# Patient Record
Sex: MALE | Race: OTHER | HISPANIC OR LATINO | Employment: FULL TIME | URBAN - METROPOLITAN AREA
[De-identification: names, ages, dates, MRNs, and addresses within clinical notes are randomized per-mention and may not be internally consistent; named-entity substitution may affect disease eponyms.]

---

## 2020-12-30 ENCOUNTER — HOSPITAL ENCOUNTER (EMERGENCY)
Facility: HOSPITAL | Age: 20
Discharge: HOME | End: 2020-12-30
Attending: EMERGENCY MEDICINE
Payer: COMMERCIAL

## 2020-12-30 VITALS
WEIGHT: 171 LBS | RESPIRATION RATE: 16 BRPM | OXYGEN SATURATION: 99 % | HEART RATE: 71 BPM | SYSTOLIC BLOOD PRESSURE: 125 MMHG | TEMPERATURE: 98.7 F | HEIGHT: 66 IN | BODY MASS INDEX: 27.48 KG/M2 | DIASTOLIC BLOOD PRESSURE: 82 MMHG

## 2020-12-30 DIAGNOSIS — R30.0 DYSURIA: Primary | ICD-10-CM

## 2020-12-30 LAB
BILIRUB UR QL STRIP: NEGATIVE
CLARITY UR: CLEAR
COLOR UR: YELLOW
COLOR, POC: NORMAL
GLUCOSE UR STRIP-MCNC: NEGATIVE MG/DL
HGB UR QL STRIP.AUTO: NEGATIVE
KETONES UR STRIP-MCNC: NEGATIVE MG/DL
LEUKOCYTE ESTERASE UR QL STRIP: NEGATIVE
NITRITE UR QL STRIP: NEGATIVE
PH UR STRIP.AUTO: 8.5 [PH] (ref 4.5–8)
PROT UR STRIP-MCNC: NEGATIVE MG/DL
SP GR UR STRIP.AUTO: 1.02 (ref 1–1.03)
UROBILINOGEN UR QL STRIP.AUTO: 1 E.U./DL

## 2020-12-30 PROCEDURE — 99283 EMERGENCY DEPT VISIT LOW MDM: CPT

## 2020-12-30 PROCEDURE — 87591 N.GONORRHOEAE DNA AMP PROB: CPT | Performed by: EMERGENCY MEDICINE

## 2020-12-30 PROCEDURE — 81003 URINALYSIS AUTO W/O SCOPE: CPT

## 2020-12-30 PROCEDURE — 87491 CHLMYD TRACH DNA AMP PROBE: CPT | Performed by: EMERGENCY MEDICINE

## 2020-12-30 PROCEDURE — 99284 EMERGENCY DEPT VISIT MOD MDM: CPT | Performed by: EMERGENCY MEDICINE

## 2020-12-30 NOTE — Clinical Note
Flo Ramirez was seen and treated in our emergency department on 12/30/2020  Diagnosis:     Anil Martin  may return to work on return date  He may return on this date: 01/01/2021         If you have any questions or concerns, please don't hesitate to call        River Jean MD    ______________________________           _______________          _______________  Hospital Representative                              Date                                Time

## 2020-12-30 NOTE — DISCHARGE INSTRUCTIONS
We will call with results of your STD testing in the next 1-2 days  Return to the emergency department if symptoms worsen

## 2020-12-31 LAB
C TRACH DNA SPEC QL NAA+PROBE: NEGATIVE
N GONORRHOEA DNA SPEC QL NAA+PROBE: NEGATIVE

## 2020-12-31 NOTE — ED PROVIDER NOTES
History  Chief Complaint   Patient presents with    Medical Problem     Pt states he thinks he has an infection  HPI  Patient is a 30-year-old male no significant past medical history presenting for 2 days of dysuria  Patient denies hematuria, urethral discharge, inguinal lymphadenopathy, genital sores, testicular pain, fevers, chills  Patient states that his sexual partner has been having similar symptoms for past 2 days as well  Patient states that he is monogamous  Patient denies hematuria, flank pain, history of nephrolithiasis, has never had similar symptoms in the past   None       History reviewed  No pertinent past medical history  History reviewed  No pertinent surgical history  History reviewed  No pertinent family history  I have reviewed and agree with the history as documented  E-Cigarette/Vaping    E-Cigarette Use Never User      E-Cigarette/Vaping Substances    Nicotine No     THC No     CBD No     Flavoring No     Other No     Unknown No      Social History     Tobacco Use    Smoking status: Never Smoker    Smokeless tobacco: Never Used   Substance Use Topics    Alcohol use: Never     Frequency: Never    Drug use: Never        Review of Systems   Constitutional: Negative for chills and fever  Respiratory: Negative for cough and shortness of breath  Gastrointestinal: Negative for nausea and vomiting  Genitourinary: Positive for dysuria  Negative for discharge, flank pain, frequency, genital sores, penile pain, testicular pain and urgency  Musculoskeletal: Negative for arthralgias and myalgias  Skin: Negative for color change, pallor, rash and wound  Neurological: Negative for headaches  Psychiatric/Behavioral: Negative for confusion         Physical Exam  ED Triage Vitals [12/30/20 1429]   Temperature Pulse Respirations Blood Pressure SpO2   98 7 °F (37 1 °C) 71 16 125/82 99 %      Temp Source Heart Rate Source Patient Position - Orthostatic VS BP Location FiO2 (%)   Oral Monitor Sitting Right arm --      Pain Score       --             Orthostatic Vital Signs  Vitals:    12/30/20 1429   BP: 125/82   Pulse: 71   Patient Position - Orthostatic VS: Sitting       Physical Exam  Vitals signs and nursing note reviewed  Constitutional:       General: He is not in acute distress  Appearance: He is well-developed  He is not diaphoretic  HENT:      Head: Normocephalic and atraumatic  Right Ear: External ear normal       Left Ear: External ear normal       Nose: Nose normal       Mouth/Throat:      Pharynx: No oropharyngeal exudate  Eyes:      Conjunctiva/sclera: Conjunctivae normal    Cardiovascular:      Rate and Rhythm: Normal rate and regular rhythm  Heart sounds: Normal heart sounds  No murmur  No friction rub  No gallop  Pulmonary:      Effort: Pulmonary effort is normal  No respiratory distress  Breath sounds: Normal breath sounds  No wheezing  Chest:      Chest wall: No tenderness  Abdominal:      General: Bowel sounds are normal  There is no distension  Palpations: Abdomen is soft  There is no mass  Tenderness: There is no abdominal tenderness  There is no guarding or rebound  Genitourinary:     Comments: No suprapubic tenderness  No CVA tenderness  Musculoskeletal:         General: No deformity  Skin:     General: Skin is warm and dry  Capillary Refill: Capillary refill takes less than 2 seconds  Neurological:      Mental Status: He is alert and oriented to person, place, and time     Psychiatric:         Behavior: Behavior normal          ED Medications  Medications - No data to display    Diagnostic Studies  Results Reviewed     Procedure Component Value Units Date/Time    Chlamydia/GC amplified DNA by PCR [[id number]]  (Normal) Collected: 12/30/20 1614    Lab Status: Final result Specimen: Urine, Other Updated: 12/31/20 0656     N gonorrhoeae, DNA Probe Negative     Chlamydia trachomatis, DNA Probe Negative Narrative:      Test performed using PCR amplification of target DNA  This test is intended as an aid in the diagnosis of Chlamydial and gonococcal disease  This test has not been evaluated in patients younger than 15years of age and is not recommended for evaluation of suspected sexual abuse  Additional testing is recommended when the results do not correlate with clinical signs and symptoms  POCT urinalysis dipstick [[id number]]  (Normal) Resulted: 12/30/20 1605    Lab Status: Final result Updated: 12/30/20 1605     Color, UA       Urine Macroscopic, POC [[id number]]  (Abnormal) Collected: 12/30/20 1602    Lab Status: Final result Specimen: Urine Updated: 12/30/20 1603     Color, UA Yellow     Clarity, UA Clear     pH, UA 8 5     Leukocytes, UA Negative     Nitrite, UA Negative     Protein, UA Negative mg/dl      Glucose, UA Negative mg/dl      Ketones, UA Negative mg/dl      Urobilinogen, UA 1 0 E U /dl      Bilirubin, UA Negative     Blood, UA Negative     Specific Gravity, UA 1 020    Narrative:      CLINITEK RESULT                 No orders to display         Procedures  Procedures      ED Course                                       MDM  Number of Diagnoses or Management Options  Dysuria:   Diagnosis management comments: 24-year-old male with 2 days of dysuria, partner with similar symptoms, no additional symptoms, no high-risk sexual history, benign examination, urine GC chlamydia and urinalysis checked, urinalysis normal, discussed with patient and decision to wait to treat empirically, will follow-up results of GC chlamydia which were ultimately negative       Amount and/or Complexity of Data Reviewed  Clinical lab tests: ordered and reviewed  Decide to obtain previous medical records or to obtain history from someone other than the patient: yes  Review and summarize past medical records: yes  Independent visualization of images, tracings, or specimens: yes    Risk of Complications, Morbidity, and/or Mortality  Presenting problems: low  Diagnostic procedures: low  Management options: low    Patient Progress  Patient progress: improved      Disposition  Final diagnoses:   Dysuria     Time reflects when diagnosis was documented in both MDM as applicable and the Disposition within this note     Time User Action Codes Description Comment    12/30/2020  4:17 PM Mercvalencia Rivera Add [Y93 G3] Use of stove, oven and microwave oven     12/30/2020  4:17 PM Mercy Nicole Remove [Y93 G3] Use of stove, oven and microwave oven     12/30/2020  5:19 PM Mercvalencia Nicole Add [R30 0] Dysuria       ED Disposition     ED Disposition Condition Date/Time Comment    Discharge Stable Wed Dec 30, 2020  5:19 PM Mengcaitlingdamián 75 discharge to home/self care  Follow-up Information    None         There are no discharge medications for this patient  No discharge procedures on file  PDMP Review     None           ED Provider  Attending physically available and evaluated Davideenherbgtiffaniej 75  I managed the patient along with the ED Attending      Electronically Signed by         River Jean MD  12/31/20 4210

## 2021-01-01 NOTE — ED ATTENDING ATTESTATION
12/30/2020  IDoris MD, saw and evaluated the patient  I have discussed the patient with the resident/non-physician practitioner and agree with the resident's/non-physician practitioner's findings, Plan of Care, and MDM as documented in the resident's/non-physician practitioner's note, except where noted  All available labs and Radiology studies were reviewed  I was present for key portions of any procedure(s) performed by the resident/non-physician practitioner and I was immediately available to provide assistance  At this point I agree with the current assessment done in the Emergency Department  I have conducted an independent evaluation of this patient a history and physical is as follows: This is a 23 y o  old male who presents to the ED for evaluation of dysuria  2d  No discharge  Sexually active with female, she is with him, same symtpoms, pregnant  No fever, no abd pain  No other complaitns  VS and nursing notes reviewed  General: Appears in NAD, awake, alert, speaking normally in full sentences  Well-nourished, well-developed  Appears stated age  Head: Normocephalic, atraumatic  Eyes: EOMI  Vision grossly normal  No subconjunctival hemorrhages or occular discharge noted  Symmetrical lids  ENT: Atraumatic external nose and ears  No stridor  Normal phonation  No drooling  Normal swallowing  Neck: No JVD  FROM  No goiter  CV: No pallor  Normal rate  Lungs: No tachypnea  No respiratory distress  MSK: Moving all extremities equally, no peripheral edema  Skin: Dry, intact  No cyanosis  Neuro: Awake, alert, GCS15  CN II-XII grossly intact  Grossly normal gait  Psychiatric/Behavioral: Appropriate mood and affect  A/P: This is a 23 y o  male who presents to the ED for evaluation of dysuria  UA, GC/Chlamydia  Consider trich treatment based on GF sypmptoms          ED Course         Critical Care Time  Procedures

## 2021-02-04 ENCOUNTER — HOSPITAL ENCOUNTER (EMERGENCY)
Facility: HOSPITAL | Age: 21
Discharge: HOME | End: 2021-02-04
Attending: EMERGENCY MEDICINE | Admitting: EMERGENCY MEDICINE
Payer: COMMERCIAL

## 2021-02-04 ENCOUNTER — TRANSCRIBE ORDERS (OUTPATIENT)
Dept: ADMINISTRATIVE | Facility: HOSPITAL | Age: 21
End: 2021-02-04

## 2021-02-04 VITALS
WEIGHT: 173 LBS | TEMPERATURE: 97.6 F | SYSTOLIC BLOOD PRESSURE: 135 MMHG | BODY MASS INDEX: 27.92 KG/M2 | DIASTOLIC BLOOD PRESSURE: 80 MMHG | OXYGEN SATURATION: 98 % | HEART RATE: 79 BPM | RESPIRATION RATE: 18 BRPM

## 2021-02-04 DIAGNOSIS — R10.13 EPIGASTRIC PAIN: Primary | ICD-10-CM

## 2021-02-04 DIAGNOSIS — R10.9 ABDOMINAL PAIN, UNSPECIFIED ABDOMINAL LOCATION: Primary | ICD-10-CM

## 2021-02-04 LAB
ALBUMIN SERPL BCP-MCNC: 4.4 G/DL (ref 3.5–5)
ALP SERPL-CCNC: 91 U/L (ref 46–116)
ALT SERPL W P-5'-P-CCNC: 32 U/L (ref 12–78)
AMYLASE SERPL-CCNC: 79 IU/L (ref 25–115)
ANION GAP SERPL CALCULATED.3IONS-SCNC: 5 MMOL/L (ref 4–13)
AST SERPL W P-5'-P-CCNC: 33 U/L (ref 5–45)
BASOPHILS # BLD AUTO: 0.03 THOUSANDS/ΜL (ref 0–0.1)
BASOPHILS NFR BLD AUTO: 0 % (ref 0–1)
BILIRUB SERPL-MCNC: 0.32 MG/DL (ref 0.2–1)
BUN SERPL-MCNC: 16 MG/DL (ref 5–25)
CALCIUM SERPL-MCNC: 10.2 MG/DL (ref 8.3–10.1)
CHLORIDE SERPL-SCNC: 106 MMOL/L (ref 100–108)
CO2 SERPL-SCNC: 29 MMOL/L (ref 21–32)
CREAT SERPL-MCNC: 1.1 MG/DL (ref 0.6–1.3)
EOSINOPHIL # BLD AUTO: 0.5 THOUSAND/ΜL (ref 0–0.61)
EOSINOPHIL NFR BLD AUTO: 7 % (ref 0–6)
ERYTHROCYTE [DISTWIDTH] IN BLOOD BY AUTOMATED COUNT: 12.2 % (ref 11.6–15.1)
GFR SERPL CREATININE-BSD FRML MDRD: 95 ML/MIN/1.73SQ M
GLUCOSE SERPL-MCNC: 86 MG/DL (ref 65–140)
HCT VFR BLD AUTO: 47.4 % (ref 36.5–49.3)
HGB BLD-MCNC: 16.1 G/DL (ref 12–17)
IMM GRANULOCYTES # BLD AUTO: 0.01 THOUSAND/UL (ref 0–0.2)
IMM GRANULOCYTES NFR BLD AUTO: 0 % (ref 0–2)
LIPASE SERPL-CCNC: 155 U/L (ref 73–393)
LYMPHOCYTES # BLD AUTO: 1.21 THOUSANDS/ΜL (ref 0.6–4.47)
LYMPHOCYTES NFR BLD AUTO: 18 % (ref 14–44)
MCH RBC QN AUTO: 29.7 PG (ref 26.8–34.3)
MCHC RBC AUTO-ENTMCNC: 34 G/DL (ref 31.4–37.4)
MCV RBC AUTO: 87 FL (ref 82–98)
MONOCYTES # BLD AUTO: 0.6 THOUSAND/ΜL (ref 0.17–1.22)
MONOCYTES NFR BLD AUTO: 9 % (ref 4–12)
NEUTROPHILS # BLD AUTO: 4.52 THOUSANDS/ΜL (ref 1.85–7.62)
NEUTS SEG NFR BLD AUTO: 66 % (ref 43–75)
NRBC BLD AUTO-RTO: 0 /100 WBCS
PLATELET # BLD AUTO: 315 THOUSANDS/UL (ref 149–390)
PMV BLD AUTO: 8.9 FL (ref 8.9–12.7)
POTASSIUM SERPL-SCNC: 4 MMOL/L (ref 3.5–5.3)
PROT SERPL-MCNC: 8.3 G/DL (ref 6.4–8.2)
RBC # BLD AUTO: 5.43 MILLION/UL (ref 3.88–5.62)
SODIUM SERPL-SCNC: 140 MMOL/L (ref 136–145)
WBC # BLD AUTO: 6.87 THOUSAND/UL (ref 4.31–10.16)

## 2021-02-04 PROCEDURE — 36415 COLL VENOUS BLD VENIPUNCTURE: CPT | Performed by: EMERGENCY MEDICINE

## 2021-02-04 PROCEDURE — 82150 ASSAY OF AMYLASE: CPT | Performed by: EMERGENCY MEDICINE

## 2021-02-04 PROCEDURE — 99285 EMERGENCY DEPT VISIT HI MDM: CPT | Performed by: EMERGENCY MEDICINE

## 2021-02-04 PROCEDURE — 83690 ASSAY OF LIPASE: CPT | Performed by: EMERGENCY MEDICINE

## 2021-02-04 PROCEDURE — 80053 COMPREHEN METABOLIC PANEL: CPT | Performed by: EMERGENCY MEDICINE

## 2021-02-04 PROCEDURE — 99283 EMERGENCY DEPT VISIT LOW MDM: CPT

## 2021-02-04 PROCEDURE — 85025 COMPLETE CBC W/AUTO DIFF WBC: CPT | Performed by: EMERGENCY MEDICINE

## 2021-02-04 NOTE — ED ATTENDING ATTESTATION
2/4/2021  IAriela MD, saw and evaluated the patient  I have discussed the patient with the resident/non-physician practitioner and agree with the resident's/non-physician practitioner's findings, Plan of Care, and MDM as documented in the resident's/non-physician practitioner's note, except where noted  All available labs and Radiology studies were reviewed  I was present for key portions of any procedure(s) performed by the resident/non-physician practitioner and I was immediately available to provide assistance  At this point I agree with the current assessment done in the Emergency Department  I have conducted an independent evaluation of this patient a history and physical is as follows:    ED Course         Critical Care Time  Procedures    22 yo male sent in from np for acid reflux and told to get labs done here  Pt with no acute complaints currently  No cp, no sob, no n/v/d  Vss, afebrile, lungs cta, rrr, abdomen soft nontender  Labs

## 2021-02-04 NOTE — ED PROVIDER NOTES
History  Chief Complaint   Patient presents with    Medical Problem     Pt states he was at a doctors appointment and was told to come to ED for acid reflux eval  Pt c/o of nausea and diarrhea  43-year-old male presents with epi gastric pain, nausea, and heartburn began last Wednesday  Patient was seen by nurse practitioner earlier today  She recommended outpatient labs, but it seems as if patient came to the ED  Patient says he has the heartburn in the morning  He feels nauseous after he eats  Patient has only tried Pepto-Bismol for his symptoms, and it has only helped with some intermittent diarrhea that he has had  Patient also notes at times he does have constipation  No blood in his stool  Patient denies chest pain  He has had 2 episodes of vomiting  He has never had the symptoms before in past           None       History reviewed  No pertinent past medical history  History reviewed  No pertinent surgical history  History reviewed  No pertinent family history  I have reviewed and agree with the history as documented  E-Cigarette/Vaping    E-Cigarette Use Never User      E-Cigarette/Vaping Substances    Nicotine No     THC No     CBD No     Flavoring No     Other No     Unknown No      Social History     Tobacco Use    Smoking status: Never Smoker    Smokeless tobacco: Never Used   Substance Use Topics    Alcohol use: Never     Frequency: Never    Drug use: Never        Review of Systems   Constitutional: Negative for appetite change, chills, fatigue and fever  HENT: Negative  Eyes: Negative  Respiratory: Negative for cough, chest tightness and shortness of breath  Cardiovascular: Negative for chest pain and palpitations  Gastrointestinal: Positive for abdominal pain and nausea  Negative for diarrhea and vomiting  Endocrine: Negative  Genitourinary: Negative for difficulty urinating and hematuria  Musculoskeletal: Negative for arthralgias and myalgias  Skin: Negative for pallor and rash  Allergic/Immunologic: Negative  Neurological: Negative for dizziness, weakness, light-headedness and headaches  Hematological: Negative  Physical Exam  ED Triage Vitals [02/04/21 1724]   Temperature Pulse Respirations Blood Pressure SpO2   97 6 °F (36 4 °C) 79 18 135/80 98 %      Temp Source Heart Rate Source Patient Position - Orthostatic VS BP Location FiO2 (%)   Oral Monitor Sitting Right arm --      Pain Score       --             Orthostatic Vital Signs  Vitals:    02/04/21 1724   BP: 135/80   Pulse: 79   Patient Position - Orthostatic VS: Sitting       Physical Exam  Vitals signs and nursing note reviewed  Constitutional:       Appearance: Normal appearance  HENT:      Head: Normocephalic and atraumatic  Eyes:      Conjunctiva/sclera: Conjunctivae normal    Neck:      Musculoskeletal: Normal range of motion and neck supple  Cardiovascular:      Rate and Rhythm: Normal rate and regular rhythm  Pulmonary:      Effort: Pulmonary effort is normal    Abdominal:      General: Abdomen is flat  There is no distension  Palpations: Abdomen is soft  Tenderness: There is no abdominal tenderness  Musculoskeletal: Normal range of motion  Skin:     General: Skin is warm and dry  Neurological:      General: No focal deficit present  Mental Status: He is alert and oriented to person, place, and time           ED Medications  Medications - No data to display    Diagnostic Studies  Results Reviewed     Procedure Component Value Units Date/Time    Lipase [[id number]]  (Normal) Collected: 02/04/21 1833    Lab Status: Final result Specimen: Blood from Arm, Right Updated: 02/04/21 1903     Lipase 155 u/L     Amylase [[id number]]  (Normal) Collected: 02/04/21 1833    Lab Status: Final result Specimen: Blood from Arm, Right Updated: 02/04/21 1903     Amylase 79 IU/L     Comprehensive metabolic panel [[id number]]  (Abnormal) Collected: 02/04/21 1833    Lab Status: Final result Specimen: Blood from Arm, Right Updated: 02/04/21 1903     Sodium 140 mmol/L      Potassium 4 0 mmol/L      Chloride 106 mmol/L      CO2 29 mmol/L      ANION GAP 5 mmol/L      BUN 16 mg/dL      Creatinine 1 10 mg/dL      Glucose 86 mg/dL      Calcium 10 2 mg/dL      AST 33 U/L      ALT 32 U/L      Alkaline Phosphatase 91 U/L      Total Protein 8 3 g/dL      Albumin 4 4 g/dL      Total Bilirubin 0 32 mg/dL      eGFR 95 ml/min/1 73sq m     Narrative:      National Kid[employer] Disease Foundation guidelines for Chronic Kidney Disease (CKD):     Stage 1 with normal or high GFR (GFR > 90 mL/min/1 73 square meters)    Stage 2 Mild CKD (GFR = 60-89 mL/min/1 73 square meters)    Stage 3A Moderate CKD (GFR = 45-59 mL/min/1 73 square meters)    Stage 3B Moderate CKD (GFR = 30-44 mL/min/1 73 square meters)    Stage 4 Severe CKD (GFR = 15-29 mL/min/1 73 square meters)    Stage 5 End Stage CKD (GFR <15 mL/min/1 73 square meters)  Note: GFR calculation is accurate only with a steady state creatinine    CBC and differential [[id number]]  (Abnormal) Collected: 02/04/21 1833    Lab Status: Final result Specimen: Blood from Arm, Right Updated: 02/04/21 1842     WBC 6 87 Thousand/uL      RBC 5 43 Million/uL      Hemoglobin 16 1 g/dL      Hematocrit 47 4 %      MCV 87 fL      MCH 29 7 pg      MCHC 34 0 g/dL      RDW 12 2 %      MPV 8 9 fL      Platelets 029 Thousands/uL      nRBC 0 /100 WBCs      Neutrophils Relative 66 %      Immat GRANS % 0 %      Lymphocytes Relative 18 %      Monocytes Relative 9 %      Eosinophils Relative 7 %      Basophils Relative 0 %      Neutrophils Absolute 4 52 Thousands/µL      Immature Grans Absolute 0 01 Thousand/uL      Lymphocytes Absolute 1 21 Thousands/µL      Monocytes Absolute 0 60 Thousand/µL      Eosinophils Absolute 0 50 Thousand/µL      Basophils Absolute 0 03 Thousands/µL                  No orders to display         Procedures  Procedures      ED Course SBIRT 22yo+      Most Recent Value   SBIRT (22 yo +)   In order to provide better care to our patients, we are screening all of our patients for alcohol and drug use  Would it be okay to ask you these screening questions? Unable to answer at this time Filed at: 02/04/2021 1728                [facility]  Number of Diagnoses or Management Options  Epigastric pain: established and improving  Diagnosis management comments: Tried to see if patient could be un-registered and the labs could be done as an outpatient, but it cannot be done  Will run the labs and have patient follow-up with the nurse practitioner who ordered them  Patient is not currently having symptoms and does not want any medication  Amount and/or Complexity of Data Reviewed  Clinical lab tests: ordered and reviewed  Review and summarize past medical records: yes  Discuss the patient with other providers: yes    Risk of Complications, Morbidity, and/or Mortality  Presenting problems: minimal  Diagnostic procedures: minimal  Management options: minimal    Patient Progress  Patient progress: improved    Patient's lab work here is not significant for any acute pathology  Explained to patient that he has to follow-up with the nurse practitioner that ordered this lab work  Return precautions given  Disposition  Final diagnoses:   Epigastric pain     Time reflects when diagnosis was documented in both MDM as applicable and the Disposition within this note     Time User Action Codes Description Comment    2/4/2021  7:06 PM Maximino Freire Add [R10 13] Epigastric pain       ED Disposition     ED Disposition Condition Date/Time Comment    Discharge Good Thu Feb 4, 2021  7:06 PM Jef 75 discharge to home/self care  Follow-up Information     Follow up With Specialties Details Why Contact Info    Follow up with NP Homa              There are no discharge medications for this patient      No discharge procedures on file  PDMP Review     None           ED Provider  Attending physically available and evaluated Jaret Chery  CANDACE managed the patient along with the ED Attending      Electronically Signed by         Matthew Pederson DO  02/04/21 1916

## 2021-02-05 NOTE — DISCHARGE INSTRUCTIONS
You have been seen for epigastric pain  You should return to the ED if you develop persistent vomiting, blood in your stool, inability to eat, or other worsening symptoms  Follow up with Olga Lee who saw you earlier today

## 2021-02-10 ENCOUNTER — HOSPITAL ENCOUNTER (EMERGENCY)
Facility: HOSPITAL | Age: 21
Discharge: HOME | End: 2021-02-10
Attending: EMERGENCY MEDICINE | Admitting: EMERGENCY MEDICINE
Payer: COMMERCIAL

## 2021-02-10 VITALS
SYSTOLIC BLOOD PRESSURE: 139 MMHG | DIASTOLIC BLOOD PRESSURE: 83 MMHG | HEIGHT: 66 IN | HEART RATE: 72 BPM | TEMPERATURE: 98.1 F | RESPIRATION RATE: 18 BRPM | OXYGEN SATURATION: 99 % | BODY MASS INDEX: 27.78 KG/M2 | WEIGHT: 172.84 LBS

## 2021-02-10 DIAGNOSIS — H72.92 PERFORATION OF LEFT TYMPANIC MEMBRANE: Primary | ICD-10-CM

## 2021-02-10 PROCEDURE — 99284 EMERGENCY DEPT VISIT MOD MDM: CPT | Performed by: EMERGENCY MEDICINE

## 2021-02-10 PROCEDURE — 99282 EMERGENCY DEPT VISIT SF MDM: CPT

## 2021-02-10 RX ORDER — CIPROFLOXACIN AND DEXAMETHASONE 3; 1 MG/ML; MG/ML
4 SUSPENSION/ DROPS AURICULAR (OTIC) 2 TIMES DAILY
Qty: 7.5 ML | Refills: 0 | Status: SHIPPED | OUTPATIENT
Start: 2021-02-10

## 2021-02-10 RX ORDER — OFLOXACIN 3 MG/ML
5 SOLUTION AURICULAR (OTIC) 2 TIMES DAILY
Qty: 10 ML | Refills: 0 | Status: CANCELLED | OUTPATIENT
Start: 2021-02-10 | End: 2021-02-17

## 2021-02-10 NOTE — Clinical Note
Nasim Baldwin was seen and treated in our emergency department on 2/10/2021  Diagnosis:     Britton Echeverria  may return to work on return date  He may return on this date: 02/11/2021         If you have any questions or concerns, please don't hesitate to call        Freddy Mauricio DO    ______________________________           _______________          _______________  Hospital Representative                              Date                                Time

## 2021-02-10 NOTE — ED ATTENDING ATTESTATION
2/10/2021  IDavid DO, saw and evaluated the patient  I have discussed the patient with the resident/non-physician practitioner and agree with the resident's/non-physician practitioner's findings, Plan of Care, and MDM as documented in the resident's/non-physician practitioner's note, except where noted  All available labs and Radiology studies were reviewed  I was present for key portions of any procedure(s) performed by the resident/non-physician practitioner and I was immediately available to provide assistance  At this point I agree with the current assessment done in the Emergency Department  I have conducted an independent evaluation of this patient a history and physical is as follows:    16yo male presents with left ear pain  Pt had pain that started today and he put a q tip in ear  No fevers, no other complaints  On exam - nad, heart reg, no resp distress, right TM normal, left TM with small puncture    Plan - otic drops and f/u ENT    ED Course         Critical Care Time  Procedures

## 2021-02-10 NOTE — ED PROVIDER NOTES
History  Chief Complaint   Patient presents with    Ear Problem     Patient reports ear pain and possible infection; states all of this started today      79-year-old male presents emergency department for left ear pain  Patient says that pain started when he woke up this morning  He did use a Q-tip has had some mildly worsening pain since then  No drainage from the ear  No other ear trauma  Has not had a fever, chills, nausea, vomiting, any other symptoms or complaints  None       History reviewed  No pertinent past medical history  History reviewed  No pertinent surgical history  History reviewed  No pertinent family history  I have reviewed and agree with the history as documented  E-Cigarette/Vaping    E-Cigarette Use Never User      E-Cigarette/Vaping Substances    Nicotine No     THC No     CBD No     Flavoring No     Other No     Unknown No      Social History     Tobacco Use    Smoking status: Never Smoker    Smokeless tobacco: Never Used   Substance Use Topics    Alcohol use: Never     Frequency: Never    Drug use: Never        Review of Systems   Constitutional: Negative  Negative for fever  HENT: Positive for ear pain  Negative for ear discharge and sore throat  Eyes: Negative  Respiratory: Negative  Negative for cough  Gastrointestinal: Negative  Negative for nausea  Skin: Negative  Negative for rash  All other systems reviewed and are negative  Physical Exam  ED Triage Vitals [02/10/21 1639]   Temperature Pulse Respirations Blood Pressure SpO2   98 1 °F (36 7 °C) 72 18 139/83 99 %      Temp Source Heart Rate Source Patient Position - Orthostatic VS BP Location FiO2 (%)   Oral Monitor Sitting Right arm --      Pain Score       5             Orthostatic Vital Signs  Vitals:    02/10/21 1639   BP: 139/83   Pulse: 72   Patient Position - Orthostatic VS: Sitting       Physical Exam  Constitutional:       General: He is not in acute distress  Appearance: He is well-developed  HENT:      Head: Normocephalic and atraumatic  Right Ear: Hearing, ear canal and external ear normal  No middle ear effusion  There is no impacted cerumen  No foreign body  No mastoid tenderness  Tympanic membrane is not scarred, perforated or erythematous  Left Ear: Hearing and external ear normal   No middle ear effusion  There is no impacted cerumen  No foreign body  No mastoid tenderness  Tympanic membrane is perforated  Tympanic membrane is not erythematous  Neck:      Musculoskeletal: Normal range of motion and neck supple  Cardiovascular:      Rate and Rhythm: Normal rate  Pulmonary:      Effort: Pulmonary effort is normal    Musculoskeletal: Normal range of motion  Skin:     General: Skin is warm and dry  Capillary Refill: Capillary refill takes less than 2 seconds  Neurological:      Mental Status: He is alert and oriented to person, place, and time  ED Medications  Medications - No data to display    Diagnostic Studies  Results Reviewed     None                 No orders to display         Procedures  Procedures      ED Course                             SBIRT 22yo+      Most Recent Value   SBIRT (22 yo +)   In order to provide better care to our patients, we are screening all of our patients for alcohol and drug use  Would it be okay to ask you these screening questions? No Filed at: 02/10/2021 1641                [facility]  Number of Diagnoses or Management Options  Perforation of left tympanic membrane:   Diagnosis management comments: Exam consistent with perforation of the left TM  Sending home with otic antibiotics and ENT follow-up  ED return precautions provided should symptoms worsen and patient can otherwise follow up outpatient  Patient expresses an understanding and agreement with the plan and remains in good condition for discharge         Disposition  Final diagnoses:   Perforation of left tympanic membrane     Time reflects when diagnosis was documented in both MDM as applicable and the Disposition within this note     Time User Action Codes Description Comment    2/10/2021  5:33 PM Minor, Jeannette Add [H72 [id number]] Perforation of left tympanic membrane       ED Disposition     ED Disposition Condition Date/Time Comment    Discharge Good Wed Feb 10, 2021  5:33 PM Jef Breaux discharge to home/self care  Follow-up Information     Follow up With Specialties Details Why Contact Info Additional [facility] in 1 day  [address] [place] [address]  [address] [id number]  [facility], [address], [address] [place] [address]  400, [facility], [address] Emergency Department Emergency Medicine Go to  If symptoms worsen [address]  [address] [facility] 64 [facility] Emergency Department, [address], [place], [place], [address]   [phone]          Discharge Medication List as of 2/10/2021  5:34 PM      START taking these medications    Details   ciprofloxacin-dexamethasone (CIPRODEX) otic suspension Administer 4 drops into the left ear 2 (two) times a day, Starting Wed 2/10/2021, Print           No discharge procedures on file  PDMP Review     None           ED Provider  Attending physically available and evaluated Jef 76  I managed the patient along with the ED Attending      Electronically Signed by         Renetta San MD  02/10/21 2793

## 2022-02-17 ENCOUNTER — APPOINTMENT (OUTPATIENT)
Dept: LAB | Facility: HOSPITAL | Age: 22
End: 2022-02-17
Payer: MEDICARE

## 2022-02-17 DIAGNOSIS — R63.4 ABNORMAL WEIGHT LOSS: ICD-10-CM

## 2022-02-17 DIAGNOSIS — R10.30 ABDOMINAL PAIN, LOWER: ICD-10-CM

## 2022-02-17 LAB
ALBUMIN SERPL BCP-MCNC: 4.2 G/DL (ref 3.5–5)
ALP SERPL-CCNC: 70 U/L (ref 46–116)
ALT SERPL W P-5'-P-CCNC: 28 U/L (ref 12–78)
ANION GAP SERPL CALCULATED.3IONS-SCNC: 5 MMOL/L (ref 4–13)
AST SERPL W P-5'-P-CCNC: 23 U/L (ref 5–45)
BASOPHILS # BLD AUTO: 0.04 THOUSANDS/ΜL (ref 0–0.1)
BASOPHILS NFR BLD AUTO: 1 % (ref 0–1)
BILIRUB SERPL-MCNC: 0.42 MG/DL (ref 0.2–1)
BUN SERPL-MCNC: 17 MG/DL (ref 5–25)
CALCIUM SERPL-MCNC: 10.2 MG/DL (ref 8.3–10.1)
CHLORIDE SERPL-SCNC: 107 MMOL/L (ref 100–108)
CO2 SERPL-SCNC: 26 MMOL/L (ref 21–32)
CREAT SERPL-MCNC: 0.85 MG/DL (ref 0.6–1.3)
EOSINOPHIL # BLD AUTO: 0.47 THOUSAND/ΜL (ref 0–0.61)
EOSINOPHIL NFR BLD AUTO: 8 % (ref 0–6)
ERYTHROCYTE [DISTWIDTH] IN BLOOD BY AUTOMATED COUNT: 12.4 % (ref 11.6–15.1)
EST. AVERAGE GLUCOSE BLD GHB EST-MCNC: 94 MG/DL
GFR SERPL CREATININE-BSD FRML MDRD: 123 ML/MIN/1.73SQ M
GLUCOSE P FAST SERPL-MCNC: 93 MG/DL (ref 65–99)
HBA1C MFR BLD: 4.9 %
HCT VFR BLD AUTO: 47.1 % (ref 36.5–49.3)
HGB BLD-MCNC: 16.1 G/DL (ref 12–17)
IMM GRANULOCYTES # BLD AUTO: 0.02 THOUSAND/UL (ref 0–0.2)
IMM GRANULOCYTES NFR BLD AUTO: 0 % (ref 0–2)
LIPASE SERPL-CCNC: 246 U/L (ref 73–393)
LYMPHOCYTES # BLD AUTO: 1.14 THOUSANDS/ΜL (ref 0.6–4.47)
LYMPHOCYTES NFR BLD AUTO: 19 % (ref 14–44)
MCH RBC QN AUTO: 29.8 PG (ref 26.8–34.3)
MCHC RBC AUTO-ENTMCNC: 34.2 G/DL (ref 31.4–37.4)
MCV RBC AUTO: 87 FL (ref 82–98)
MONOCYTES # BLD AUTO: 0.42 THOUSAND/ΜL (ref 0.17–1.22)
MONOCYTES NFR BLD AUTO: 7 % (ref 4–12)
NEUTROPHILS # BLD AUTO: 3.9 THOUSANDS/ΜL (ref 1.85–7.62)
NEUTS SEG NFR BLD AUTO: 65 % (ref 43–75)
NRBC BLD AUTO-RTO: 0 /100 WBCS
PLATELET # BLD AUTO: 302 THOUSANDS/UL (ref 149–390)
PMV BLD AUTO: 9 FL (ref 8.9–12.7)
POTASSIUM SERPL-SCNC: 4 MMOL/L (ref 3.5–5.3)
PROT SERPL-MCNC: 8.2 G/DL (ref 6.4–8.2)
RBC # BLD AUTO: 5.4 MILLION/UL (ref 3.88–5.62)
SODIUM SERPL-SCNC: 138 MMOL/L (ref 136–145)
WBC # BLD AUTO: 5.99 THOUSAND/UL (ref 4.31–10.16)

## 2022-02-17 PROCEDURE — 85025 COMPLETE CBC W/AUTO DIFF WBC: CPT

## 2022-02-17 PROCEDURE — 83690 ASSAY OF LIPASE: CPT

## 2022-02-17 PROCEDURE — 84154 ASSAY OF PSA FREE: CPT

## 2022-02-17 PROCEDURE — 36415 COLL VENOUS BLD VENIPUNCTURE: CPT

## 2022-02-17 PROCEDURE — 83036 HEMOGLOBIN GLYCOSYLATED A1C: CPT

## 2022-02-17 PROCEDURE — 84153 ASSAY OF PSA TOTAL: CPT

## 2022-02-17 PROCEDURE — 80053 COMPREHEN METABOLIC PANEL: CPT

## 2022-02-19 LAB
PSA FREE MFR SERPL: 70 %
PSA FREE SERPL-MCNC: 0.14 NG/ML
PSA SERPL-MCNC: 0.2 NG/ML (ref 0–4)

## 2022-03-25 ENCOUNTER — HOSPITAL ENCOUNTER (EMERGENCY)
Facility: HOSPITAL | Age: 22
Discharge: HOME | End: 2022-03-25
Attending: EMERGENCY MEDICINE | Admitting: EMERGENCY MEDICINE
Payer: MEDICARE

## 2022-03-25 VITALS
HEART RATE: 81 BPM | RESPIRATION RATE: 16 BRPM | SYSTOLIC BLOOD PRESSURE: 130 MMHG | TEMPERATURE: 98.8 F | DIASTOLIC BLOOD PRESSURE: 85 MMHG | OXYGEN SATURATION: 99 %

## 2022-03-25 DIAGNOSIS — M54.50 ACUTE BILATERAL LOW BACK PAIN WITHOUT SCIATICA: Primary | ICD-10-CM

## 2022-03-25 PROCEDURE — 99283 EMERGENCY DEPT VISIT LOW MDM: CPT

## 2022-03-25 PROCEDURE — 99284 EMERGENCY DEPT VISIT MOD MDM: CPT | Performed by: EMERGENCY MEDICINE

## 2022-03-25 RX ORDER — IBUPROFEN 600 MG/1
600 TABLET ORAL ONCE
Status: COMPLETED | OUTPATIENT
Start: 2022-03-25 | End: 2022-03-25

## 2022-03-25 RX ORDER — SENNOSIDES 8.6 MG
650 CAPSULE ORAL 4 TIMES DAILY
Qty: 30 TABLET | Refills: 0 | Status: SHIPPED | OUTPATIENT
Start: 2022-03-25

## 2022-03-25 RX ORDER — ACETAMINOPHEN 325 MG/1
975 TABLET ORAL ONCE
Status: COMPLETED | OUTPATIENT
Start: 2022-03-25 | End: 2022-03-25

## 2022-03-25 RX ORDER — LIDOCAINE 50 MG/G
1 PATCH TOPICAL ONCE
Status: DISCONTINUED | OUTPATIENT
Start: 2022-03-25 | End: 2022-03-26 | Stop reason: HOSPADM

## 2022-03-25 RX ORDER — IBUPROFEN 600 MG/1
600 TABLET ORAL EVERY 6 HOURS PRN
Qty: 30 TABLET | Refills: 0 | Status: SHIPPED | OUTPATIENT
Start: 2022-03-25

## 2022-03-25 RX ADMIN — ACETAMINOPHEN 975 MG: 325 TABLET ORAL at 21:43

## 2022-03-25 RX ADMIN — LIDOCAINE 5% 1 PATCH: 700 PATCH TOPICAL at 21:44

## 2022-03-25 RX ADMIN — IBUPROFEN 600 MG: 600 TABLET, FILM COATED ORAL at 21:44

## 2022-03-25 NOTE — Clinical Note
Lois Bernstein was seen and treated in our emergency department on 3/25/2022  Diagnosis: Acute lower Back pain    Lorena Muller  may return to work on return date  He may return on this date: 03/28/2022          If you have any questions or concerns, please don't hesitate to call        Jai Granger MD    ______________________________           _______________          _______________  Hospital Representative                              Date                                Time

## 2022-03-25 NOTE — Clinical Note
accompanied Velvet Seip to the emergency department on 3/25/2022  Return date if applicable: 27/06/2081        If you have any questions or concerns, please don't hesitate to call        Wade Sellers MD

## 2022-03-26 NOTE — ED ATTENDING ATTESTATION
3/25/2022  Rolo MARIA MD, saw and evaluated the patient  I have discussed the patient with the resident/non-physician practitioner and agree with the resident's/non-physician practitioner's findings, Plan of Care, and MDM as documented in the resident's/non-physician practitioner's note, except where noted  All available labs and Radiology studies were reviewed  I was present for key portions of any procedure(s) performed by the resident/non-physician practitioner and I was immediately available to provide assistance  At this point I agree with the current assessment done in the Emergency Department  I have conducted an independent evaluation of this patient a history and physical is as follows:    MSK back pain, worse with movement  No red flags  Works in a warehouse, moving boxes  Used tylenol, no improvement  VS and nursing notes reviewed  General: Appears in NAD, awake, alert, speaking normally in full sentences  Well-nourished, well-developed  Appears stated age  Head: Normocephalic, atraumatic  Eyes: EOMI  Vision grossly normal  No subconjunctival hemorrhages or occular discharge noted  Symmetrical lids  ENT: Atraumatic external nose and ears  No stridor  Normal phonation  No drooling  Normal swallowing  Neck: No JVD  FROM  No goiter  CV: No pallor  Normal rate  Lungs: No tachypnea  No respiratory distress  MSK: Moving all extremities equally, no peripheral edema  SERGIO lumbar parapspinal, no midline  Skin: Dry, intact  No cyanosis  Neuro: Awake, alert, GCS15  CN II-XII grossly intact  Grossly normal gait  Psychiatric/Behavioral: Appropriate mood and affect  A/P: This is a 24 y o  male who presents to the ED for evaluation of back pain  MSK pain  Comp spine follow up, work note  NSAIDs and symptom management      ED Course       Critical Care Time  Procedures

## 2022-03-26 NOTE — ED PROVIDER NOTES
History  Chief Complaint   Patient presents with    Back Pain     Pt c/o severe lower back pain x 4 days  Pt denies any trauma  25-year-old male no significant past medical history that presents ED today for bilateral lower back pain  Patient said that he lifts heavy boxes at work  He has had pain for about the last 4 days  He does take Tylenol with some relief but then the pain restarted  He says that the pain is exacerbated with activity  When he is resting he said there is mild pain and it is tolerable  - Patient has no TUNA FISH red flags: Trauma, unexplained weight loss, neurologic symptoms / retention / overflow incontinence, Age > 48, Fever/night sweats, IVDU, chronic steroids, nor hx of cancer (prostate, renal, breast, lung)            Prior to Admission Medications   Prescriptions Last Dose Informant Patient Reported? Taking?   ciprofloxacin-dexamethasone (CIPRODEX) otic suspension   No No   Sig: Administer 4 drops into the left ear 2 (two) times a day      Facility-Administered Medications: None       History reviewed  No pertinent past medical history  History reviewed  No pertinent surgical history  History reviewed  No pertinent family history  I have reviewed and agree with the history as documented  E-Cigarette/Vaping    E-Cigarette Use Never User      E-Cigarette/Vaping Substances    Nicotine No     THC No     CBD No     Flavoring No     Other No     Unknown No      Social History     Tobacco Use    Smoking status: Never Smoker    Smokeless tobacco: Never Used   Vaping Use    Vaping Use: Never used   Substance Use Topics    Alcohol use: Never    Drug use: Never        Review of Systems   Constitutional: Negative for chills and fever  HENT: Negative for hearing loss  Eyes: Negative for visual disturbance  Respiratory: Negative for shortness of breath  Cardiovascular: Negative for chest pain     Gastrointestinal: Negative for abdominal pain, constipation, diarrhea, nausea and vomiting  Genitourinary: Negative for difficulty urinating  Musculoskeletal: Positive for back pain  Negative for myalgias  Skin: Negative for rash  Neurological: Negative for dizziness  Psychiatric/Behavioral: Negative for agitation  All other systems reviewed and are negative  Physical Exam  ED Triage Vitals [03/25/22 2023]   Temperature Pulse Respirations Blood Pressure SpO2   98 8 °F (37 1 °C) 81 16 130/85 99 %      Temp Source Heart Rate Source Patient Position - Orthostatic VS BP Location FiO2 (%)   Oral Monitor Sitting Right arm --      Pain Score       6             Orthostatic Vital Signs  Vitals:    03/25/22 2023   BP: 130/85   Pulse: 81   Patient Position - Orthostatic VS: Sitting       Physical Exam  Vitals and nursing note reviewed  Constitutional:       General: He is not in acute distress  Appearance: Normal appearance  He is not ill-appearing  HENT:      Head: Normocephalic and atraumatic  Right Ear: External ear normal       Left Ear: External ear normal       Nose: Nose normal  No congestion  Mouth/Throat:      Mouth: Mucous membranes are moist       Pharynx: Oropharynx is clear  No oropharyngeal exudate  Eyes:      Extraocular Movements: Extraocular movements intact  Conjunctiva/sclera: Conjunctivae normal       Pupils: Pupils are equal, round, and reactive to light  Cardiovascular:      Rate and Rhythm: Normal rate and regular rhythm  Pulses: Normal pulses  Heart sounds: Normal heart sounds  No murmur heard  Pulmonary:      Effort: Pulmonary effort is normal  No respiratory distress  Breath sounds: Normal breath sounds  No wheezing  Abdominal:      General: Abdomen is flat  Bowel sounds are normal  There is no distension  Palpations: Abdomen is soft  Tenderness: There is no abdominal tenderness  Musculoskeletal:         General: No swelling  Normal range of motion        Cervical back: Normal range of motion  No rigidity  Comments: No C, T, L-spine tenderness  Tenderness to palpation over the bilateral lumbar region  Skin:     General: Skin is warm and dry  Capillary Refill: Capillary refill takes less than 2 seconds  Neurological:      General: No focal deficit present  Mental Status: He is alert and oriented to person, place, and time  Mental status is at baseline  Cranial Nerves: No cranial nerve deficit  Motor: No weakness  Gait: Gait normal    Psychiatric:         Mood and Affect: Mood normal          Behavior: Behavior normal          ED Medications  Medications   acetaminophen (TYLENOL) tablet 975 mg (has no administration in time range)   ibuprofen (MOTRIN) tablet 600 mg (has no administration in time range)   lidocaine (LIDODERM) 5 % patch 1 patch (has no administration in time range)       Diagnostic Studies  Results Reviewed     None                 No orders to display         Procedures  Procedures      ED Course                                       MDM  Number of Diagnoses or Management Options  Acute bilateral low back pain without sciatica  Diagnosis management comments: 79-year-old male presents ED today with bilateral lower back pain  At this time, likely musculoskeletal nature  Discussed with the patient proper lifting techniques at work  Will treat the patient with Tylenol and ibuprofen here  Will also place Lidoderm patch  Instructed patient to continue Tylenol and ibuprofen therapy as an outpatient  Encouraged outpatient follow-up  Place referral for comprehensive spine for physical therapy  Strict return to ER precautions given the patient was discharged        Disposition  Final diagnoses:   Acute bilateral low back pain without sciatica     Time reflects when diagnosis was documented in both MDM as applicable and the Disposition within this note     Time User Action Codes Description Comment    3/25/2022  9:09 PM Dimas Coon Add [C16 [id number]] Acute bilateral low back pain without sciatica       ED Disposition     ED Disposition Condition Date/Time Comment    Discharge Stable Fri Mar 25, 2022  9:09 PM [address] [place] [address] discharge to home/self care  Follow-up Information     Follow up With Specialties Details Why Contact Info Additional Information    [facility] Physical Therapy Call  to schedule an appointment for managment of your back pain  [phone] [facility] Emergency Medicine Go to  If symptoms worsen, As needed [address] Emergency Department, [address] [place] [address] 108          Patient's Medications   Discharge Prescriptions    ACETAMINOPHEN (TYLENOL) 650 MG CR TABLET    Take 1 tablet (650 mg total) by mouth 4 (four) times a day       Start Date: 3/25/2022 End Date: --       Order Dose: 650 mg       Quantity: 30 tablet    Refills: 0    IBUPROFEN (MOTRIN) 600 MG TABLET    Take 1 tablet (600 mg total) by mouth every 6 (six) hours as needed for mild pain       Start Date: 3/25/2022 End Date: --       Order Dose: 600 mg       Quantity: 30 tablet    Refills: 0         PDMP Review     None           ED Provider  Attending physically available and evaluated [facility] managed the patient along with the ED Attending      Electronically Signed by         Johnathan Garrett MD  03/25/22 4701

## 2022-03-30 ENCOUNTER — TELEPHONE (OUTPATIENT)
Dept: PHYSICAL THERAPY | Facility: OTHER | Age: 22
End: 2022-03-30

## 2022-03-31 ENCOUNTER — HOSPITAL ENCOUNTER (OUTPATIENT)
Dept: RADIOLOGY | Age: 22
Discharge: HOME | End: 2022-03-31
Payer: MEDICARE

## 2022-03-31 DIAGNOSIS — R10.30 INGUINAL PAIN, UNSPECIFIED LATERALITY: ICD-10-CM

## 2022-03-31 PROCEDURE — 76700 US EXAM ABDOM COMPLETE: CPT

## 2022-03-31 PROCEDURE — 76857 US EXAM PELVIC LIMITED: CPT

## 2022-04-12 ENCOUNTER — TELEPHONE (OUTPATIENT)
Dept: PHYSICAL THERAPY | Facility: OTHER | Age: 22
End: 2022-04-12

## 2022-04-12 NOTE — TELEPHONE ENCOUNTER
Nurse reached out to discuss [facility] referral placed in EMR  Patient now states prior issues are resolved and no longer in need of intervention and/or treatment  Nurse gave a brief overview of the services offered through [address] [place] [address] and encouraged client to contact our program in the future if needed  Patient agreed and appreciative of call       Referral closed

## 2025-05-05 NOTE — DISCHARGE INSTRUCTIONS
You were seen in the ED today for your back pain  At this time you will take 650 of tylenol every 6 hours for your pain, and ibuprofen 600 mg every 6 hours for your pain  Follow up with your PCP  Someone from physical therapy will also give you a call to set up physical therapy  Return to the ED if you have any concerns 
Additional Notes: Patient will hold Medrol
Render Risk Assessment In Note?: no
Detail Level: Detailed